# Patient Record
Sex: MALE | Race: WHITE | Employment: PART TIME | URBAN - METROPOLITAN AREA
[De-identification: names, ages, dates, MRNs, and addresses within clinical notes are randomized per-mention and may not be internally consistent; named-entity substitution may affect disease eponyms.]

---

## 2018-01-10 ENCOUNTER — OFFICE VISIT (OUTPATIENT)
Dept: FAMILY MEDICINE CLINIC | Age: 28
End: 2018-01-10

## 2018-01-10 DIAGNOSIS — Z11.3 SCREENING FOR STD (SEXUALLY TRANSMITTED DISEASE): ICD-10-CM

## 2018-01-10 DIAGNOSIS — Z13.31 SCREENING FOR DEPRESSION: ICD-10-CM

## 2018-01-10 DIAGNOSIS — Z83.42 FAMILY HISTORY OF HIGH CHOLESTEROL: ICD-10-CM

## 2018-01-10 DIAGNOSIS — Z00.00 ROUTINE PHYSICAL EXAMINATION: Primary | ICD-10-CM

## 2018-01-10 DIAGNOSIS — Z11.4 SCREENING FOR HIV (HUMAN IMMUNODEFICIENCY VIRUS): ICD-10-CM

## 2018-01-10 DIAGNOSIS — Z83.42 FAMILY HISTORY OF HYPERCHOLESTEROLEMIA: ICD-10-CM

## 2018-01-10 RX ORDER — CALCIUM CARBONATE 200(500)MG
1 TABLET,CHEWABLE ORAL AS NEEDED
COMMUNITY
Start: 2018-01-10

## 2018-01-10 NOTE — PATIENT INSTRUCTIONS

## 2018-01-10 NOTE — PROGRESS NOTES
HISTORY OF PRESENT ILLNESS  Joseph Lucas is a 24 y.o. male. HPI Comments: New patient presents today to establish care and for routine physical exam with labs, requesting HIV testing. Only complaint today is cough, onset 1 month ago, intermittent, has improved since onset, sputum production occasional clear and thin, with rhinorrhea occasional scant epistaxis. He has been taking dayquil with relief. Denies fever, chills, body aches, sore throat, sinus pressure. Past Medical History:  No date: Acid reflux  No date: Anxiety  No date: Asthma  2016: Dehydration      Comment: requiring hospitalization  No date: History of attention deficit hyperactivity dis*      Comment: childhood- treated with medication  No date: IBS (irritable bowel syndrome)      Comment: Constipation  sophmore year HS: MRSA nasal colonization      Comment: requiring surgical removal       Current Outpatient Prescriptions:     calcium carbonate (TUMS) 200 mg calcium (500 mg) chew, Take 1 Tab by mouth as needed    lansoprazole (PREVACID) 15 mg capsule, Take  by mouth Daily (before breakfast) as needed    Review of patient's family history indicates:    Breast Cancer                  Mother                    High Cholesterol               Mother                    Other                          Mother                      Comment: IBS, stomach ulcers     Social History:  Alcohol use: Yes                Comment: rarely    Drug use: Yes                Special: Marijuana    Sexual activity: Yes               Partners with: Male         Physical   The history is provided by the patient. This is a new problem. Pertinent negatives include no chest pain, no abdominal pain, no headaches and no shortness of breath. Cough   The history is provided by the patient. This is a new problem. The current episode started more than 1 week ago. The problem occurs every several days. The problem has been gradually improving.  Pertinent negatives include no chest pain, no abdominal pain, no headaches and no shortness of breath. The symptoms are aggravated by coughing. Treatments tried: dayquil. The treatment provided mild relief. Review of Systems   Constitutional: Negative. Negative for chills and fever. HENT: Negative. Negative for ear pain, sinus pain and sore throat. Eyes: Negative. Respiratory: Positive for cough and sputum production. Negative for shortness of breath and wheezing. Cardiovascular: Negative. Negative for chest pain and palpitations. Gastrointestinal: Negative. Negative for abdominal pain. Genitourinary: Negative. Musculoskeletal: Negative. Skin: Negative. Negative for itching and rash. Neurological: Negative. Negative for headaches. Endo/Heme/Allergies: Negative. Psychiatric/Behavioral: The patient is nervous/anxious. Physical Exam   Constitutional: He is oriented to person, place, and time. No distress. HENT:   Right Ear: Tympanic membrane and external ear normal.   Left Ear: Tympanic membrane and external ear normal.   Nose: Nose normal. No mucosal edema or rhinorrhea. No epistaxis. Mouth/Throat: Uvula is midline and oropharynx is clear and moist.   Eyes: Conjunctivae are normal. Pupils are equal, round, and reactive to light. Cardiovascular: Normal rate, regular rhythm, normal heart sounds and intact distal pulses. Pulmonary/Chest: Effort normal and breath sounds normal. No respiratory distress. He has no wheezes. Lymphadenopathy:     He has no cervical adenopathy. Neurological: He is alert and oriented to person, place, and time. Skin: Skin is warm and dry. Psychiatric: He has a normal mood and affect. ASSESSMENT and PLAN    ICD-10-CM ICD-9-CM    1. Routine physical examination Z00.00 V70.0 CBC W/O DIFF      METABOLIC PANEL, COMPREHENSIVE      LIPID PANEL   2.  Family history of hypercholesterolemia Z83.42 V18.19 LIPID PANEL   3. Screening for HIV (human immunodeficiency virus) Z11.4 V73.89 HIV 1/2 AG/AB, 4TH GENERATION,W RFLX CONFIRM     Reviewed plan with patient including diagnoses, treatment and follow up. Provided AVS with education on above diagnoses. No further questions/concerns at this time. Pt to follow up as scheduled or sooner if symptoms worsen/fail to improve.

## 2018-01-10 NOTE — MR AVS SNAPSHOT
Visit Information Date & Time Provider Department Dept. Phone Encounter #  
 1/10/2018  2:48 PM Valentín Fournier  [facility] [phone] [id number] Allergies as of 1/10/2018  Review Complete On: 11/2/2013 By: Jhon Byers Not on File Current Immunizations  Never Reviewed No immunizations on file. Not reviewed this visit You Were Diagnosed With   
  
 Codes Comments Routine physical examination    -  Primary ICD-10-CM: Z00.00 ICD-9-CM: V70.0 Family history of hypercholesterolemia     ICD-10-CM: Z83.42 
ICD-9-CM: V18.19 Vitals Smoking Status Current Every Day Smoker Your Updated Medication List  
  
   
This list is accurate as of: 1/10/18  3:02 PM.  Always use your most recent med list.  
  
  
  
  
 polyethylene glycol 17 gram/dose powder Commonly known as:  Tomas Paradise Take 17 g by mouth daily. 1 tablespoon with 8 oz of water daily PREVACID 15 mg capsule Generic drug:  lansoprazole Take  by mouth Daily (before breakfast). To-Do List   
 01/10/2018 Lab:  CBC W/O DIFF   
  
 01/10/2018 Lab:  LIPID PANEL   
  
 01/10/2018 Lab:  METABOLIC PANEL, COMPREHENSIVE Patient Instructions Well Visit, Ages 25 to 48: Care Instructions Your Care Instructions Physical exams can help you stay healthy. Your doctor has checked your overall health and may have suggested ways to take good care of yourself. He or she also may have recommended tests. At home, you can help prevent illness with healthy eating, regular exercise, and other steps. Follow-up care is a key part of your treatment and safety. Be sure to make and go to all appointments, and call your doctor if you are having problems. It's also a good idea to know your test results and keep a list of the medicines you take. How can you care for yourself at home? · Reach and stay at a healthy weight. This will lower your risk for many problems, such as obesity, diabetes, heart disease, and high blood pressure. · Get at least 30 minutes of physical activity on most days of the week. Walking is a good choice. You also may want to do other activities, such as running, swimming, cycling, or playing tennis or team sports. Discuss any changes in your exercise program with your doctor. · Do not smoke or allow others to smoke around you. If you need help quitting, talk to your doctor about stop-smoking programs and medicines. These can increase your chances of quitting for good. · Talk to your doctor about whether you have any risk factors for sexually transmitted infections (STIs). Having one sex partner (who does not have STIs and does not have sex with anyone else) is a good way to avoid these infections. · Use birth control if you do not want to have children at this time. Talk with your doctor about the choices available and what might be best for you. · Protect your skin from too much sun. When you're outdoors from 10 a.m. to 4 p.m., stay in the shade or cover up with clothing and a hat with a wide brim. Wear sunglasses that block UV rays. Even when it's cloudy, put broad-spectrum sunscreen (SPF 30 or higher) on any exposed skin. · See a dentist one or two times a year for checkups and to have your teeth cleaned. · Wear a seat belt in the car. · Drink alcohol in moderation, if at all. That means no more than 2 drinks a day for men and 1 drink a day for women. Follow your doctor's advice about when to have certain tests. These tests can spot problems early. For everyone · Cholesterol. Have the fat (cholesterol) in your blood tested after age 21. Your doctor will tell you how often to have this done based on your age, family history, or other things that can increase your risk for heart disease. · Blood pressure. Have your blood pressure checked during a routine doctor visit. Your doctor will tell you how often to check your blood pressure based on your age, your blood pressure results, and other factors. · Vision. Talk with your doctor about how often to have a glaucoma test. 
· Diabetes. Ask your doctor whether you should have tests for diabetes. · Colon cancer. Have a test for colon cancer at age 48. You may have one of several tests. If you are younger than 48, you may need a test earlier if you have any risk factors. Risk factors include whether you already had a precancerous polyp removed from your colon or whether your parent, brother, sister, or child has had colon cancer. For women · Breast exam and mammogram. Talk to your doctor about when you should have a clinical breast exam and a mammogram. Medical experts differ on whether and how often women under 50 should have these tests. Your doctor can help you decide what is right for you. · Pap test and pelvic exam. Begin Pap tests at age 24. A Pap test is the best way to find cervical cancer. The test often is part of a pelvic exam. Ask how often to have this test. 
· Tests for sexually transmitted infections (STIs). Ask whether you should have tests for STIs. You may be at risk if you have sex with more than one person, especially if your partners do not wear condoms. For men · Tests for sexually transmitted infections (STIs). Ask whether you should have tests for STIs. You may be at risk if you have sex with more than one person, especially if you do not wear a condom. · Testicular cancer exam. Ask your doctor whether you should check your testicles regularly. · Prostate exam. Talk to your doctor about whether you should have a blood test (called a PSA test) for prostate cancer.  Experts differ on whether and when men should have this test. Some experts suggest it if you are older than 39 and are -American or have a father or brother who got prostate cancer when he was younger than 72. When should you call for help? Watch closely for changes in your health, and be sure to contact your doctor if you have any problems or symptoms that concern you. Where can you learn more? Go to http://eugenie-belem.info/. Enter P072 in the search box to learn more about \"Well Visit, Ages 25 to 48: Care Instructions. \" Current as of: May 12, 2017 Content Version: 11.4 © 5788-8369 [employer]. Care instructions adapted under license by [employer] (which disclaims liability or warranty for this information). If you have questions about a medical condition or this instruction, always ask your healthcare professional. [address] any warranty or liability for your use of this information. Introducing [address]! Pat Brown introduces [employer] patient portal. Now you can access parts of your medical record, email your doctor's office, and request medication refills online. 1. In your internet browser, go to https://"[employer]". "[employer]/"[employer]" 2. Click on the First Time User? Click Here link in the Sign In box. You will see the New Member Sign Up page. 3. Enter your [employer] Access Code exactly as it appears below. You will not need to use this code after youve completed the sign-up process. If you do not sign up before the expiration date, you must request a new code. · [employer] Access Code: [email or web address] Expires: 4/10/2018  3:02 PM 
 
4. Enter the last four digits of your Social Security Number (xxxx) and Date of Birth (mm/dd/yyyy) as indicated and click Submit. You will be taken to the next sign-up page. 5. Create a [employer] ID. This will be your [employer] login ID and cannot be changed, so think of one that is secure and easy to remember. 6. Create a [employer] password. You can change your password at any time. 7. Enter your Password Reset Question and Answer. This can be used at a later time if you forget your password. 8. Enter your e-mail address. You will receive e-mail notification when new information is available in [address] 9. Click Sign Up. You can now view and download portions of your medical record. 10. Click the Download Summary menu link to download a portable copy of your medical information. If you have questions, please visit the Frequently Asked Questions section of the [employer] website. Remember, [employer] is NOT to be used for urgent needs. For medical emergencies, dial 911. Now available from your iPhone and Android! Please provide this summary of care documentation to your next provider. Your primary care clinician is listed as NONE. If you have any questions after today's visit, please call [phone].

## 2018-01-11 ENCOUNTER — LAB ONLY (OUTPATIENT)
Dept: FAMILY MEDICINE CLINIC | Age: 28
End: 2018-01-11

## 2018-01-11 ENCOUNTER — HOSPITAL ENCOUNTER (OUTPATIENT)
Dept: LAB | Age: 28
Discharge: HOME | End: 2018-01-11
Payer: SELF-PAY

## 2018-01-11 VITALS
RESPIRATION RATE: 18 BRPM | HEART RATE: 80 BPM | DIASTOLIC BLOOD PRESSURE: 75 MMHG | WEIGHT: 149 LBS | TEMPERATURE: 98.5 F | BODY MASS INDEX: 18.15 KG/M2 | SYSTOLIC BLOOD PRESSURE: 121 MMHG | HEIGHT: 76 IN | OXYGEN SATURATION: 98 %

## 2018-01-11 DIAGNOSIS — Z83.42 FAMILY HISTORY OF HYPERCHOLESTEROLEMIA: ICD-10-CM

## 2018-01-11 DIAGNOSIS — Z00.00 ROUTINE PHYSICAL EXAMINATION: ICD-10-CM

## 2018-01-11 DIAGNOSIS — Z11.4 SCREENING FOR HIV (HUMAN IMMUNODEFICIENCY VIRUS): ICD-10-CM

## 2018-01-11 DIAGNOSIS — Z83.42 FAMILY HISTORY OF HIGH CHOLESTEROL: ICD-10-CM

## 2018-01-11 DIAGNOSIS — Z11.3 SCREENING FOR STD (SEXUALLY TRANSMITTED DISEASE): ICD-10-CM

## 2018-01-11 PROBLEM — Z13.31 SCREENING FOR DEPRESSION: Status: ACTIVE | Noted: 2018-01-11

## 2018-01-11 LAB
ALBUMIN SERPL-MCNC: 4.2 G/DL (ref 3.4–5)
ALBUMIN/GLOB SERPL: 1.2 {RATIO} (ref 0.8–1.7)
ALP SERPL-CCNC: 74 U/L (ref 45–117)
ALT SERPL-CCNC: 21 U/L (ref 16–61)
ANION GAP SERPL CALC-SCNC: 8 MMOL/L (ref 3–18)
AST SERPL-CCNC: 14 U/L (ref 15–37)
BILIRUB SERPL-MCNC: 0.3 MG/DL (ref 0.2–1)
BUN SERPL-MCNC: 10 MG/DL (ref 7–18)
BUN/CREAT SERPL: 12 (ref 12–20)
CALCIUM SERPL-MCNC: 9.4 MG/DL (ref 8.5–10.1)
CHLORIDE SERPL-SCNC: 104 MMOL/L (ref 100–108)
CHOLEST SERPL-MCNC: 154 MG/DL
CO2 SERPL-SCNC: 30 MMOL/L (ref 21–32)
CREAT SERPL-MCNC: 0.83 MG/DL (ref 0.6–1.3)
ERYTHROCYTE [DISTWIDTH] IN BLOOD BY AUTOMATED COUNT: 13.1 % (ref 11.6–14.5)
GLOBULIN SER CALC-MCNC: 3.5 G/DL (ref 2–4)
GLUCOSE SERPL-MCNC: 114 MG/DL (ref 74–99)
HCT VFR BLD AUTO: 46.7 % (ref 36–48)
HDLC SERPL-MCNC: 47 MG/DL (ref 40–60)
HDLC SERPL: 3.3 {RATIO} (ref 0–5)
HGB BLD-MCNC: 15.1 G/DL (ref 13–16)
LDLC SERPL CALC-MCNC: 88.8 MG/DL (ref 0–100)
LIPID PROFILE,FLP: NORMAL
MCH RBC QN AUTO: 29.4 PG (ref 24–34)
MCHC RBC AUTO-ENTMCNC: 32.3 G/DL (ref 31–37)
MCV RBC AUTO: 90.9 FL (ref 74–97)
PLATELET # BLD AUTO: 153 K/UL (ref 135–420)
PMV BLD AUTO: 11 FL (ref 9.2–11.8)
POTASSIUM SERPL-SCNC: 4.4 MMOL/L (ref 3.5–5.5)
PROT SERPL-MCNC: 7.7 G/DL (ref 6.4–8.2)
RBC # BLD AUTO: 5.14 M/UL (ref 4.7–5.5)
SODIUM SERPL-SCNC: 142 MMOL/L (ref 136–145)
TRIGL SERPL-MCNC: 91 MG/DL (ref ?–150)
VLDLC SERPL CALC-MCNC: 18.2 MG/DL
WBC # BLD AUTO: 5.7 K/UL (ref 4.6–13.2)

## 2018-01-11 PROCEDURE — 87389 HIV-1 AG W/HIV-1&-2 AB AG IA: CPT | Performed by: NURSE PRACTITIONER

## 2018-01-11 PROCEDURE — 80053 COMPREHEN METABOLIC PANEL: CPT | Performed by: NURSE PRACTITIONER

## 2018-01-11 PROCEDURE — 85027 COMPLETE CBC AUTOMATED: CPT | Performed by: NURSE PRACTITIONER

## 2018-01-11 PROCEDURE — 80061 LIPID PANEL: CPT | Performed by: NURSE PRACTITIONER

## 2018-01-11 RX ORDER — CALCIUM CARBONATE 200(500)MG
1 TABLET,CHEWABLE ORAL AS NEEDED
COMMUNITY
Start: 2018-01-11

## 2018-01-11 NOTE — PROGRESS NOTES
Pt presented to the clinic yesterday (01/10/2018) to establish care and have a CPE. Flu Shot Requested: no    Depression Screening:  PHQ over the last two weeks 1/11/2018   Little interest or pleasure in doing things Not at all   Feeling down, depressed or hopeless Not at all   Total Score PHQ 2 0       Learning Assessment:  Learning Assessment 1/10/2018   PRIMARY LEARNER Patient   HIGHEST LEVEL OF EDUCATION - PRIMARY LEARNER  SOME COLLEGE   BARRIERS PRIMARY LEARNER NONE   CO-LEARNER CAREGIVER No   PRIMARY LANGUAGE ENGLISH   LEARNER PREFERENCE PRIMARY DEMONSTRATION   ANSWERED BY Patient   RELATIONSHIP SELF       Abuse Screening:  No flowsheet data found. Health Maintenance reviewed and discussed per provider: yes     Coordination of Care:    1. Have you been to the ER, urgent care clinic since your last visit? Hospitalized since your last visit? no    2. Have you seen or consulted any other health care providers outside of the [address] [place] [address] since your last visit? Include any pap smears or colon screening.  no

## 2018-01-11 NOTE — PROGRESS NOTES
Previous documentation in orders only encounter    HISTORY OF PRESENT ILLNESS  Gisele Monteiro is a 24 y.o. male. HPI Comments: New patient presents 1/10/18 to establish care and requesting CPE with labs, HIV test add on. Also reports cough, onset December, with intermittent clear sputum production, has been taking dayquil prn with relief. Past Medical History:  No date: Acid reflux  No date: Anxiety  No date: Attention deficit hyperactivity disorder (ADHD)      Comment: throughout childhood, treated with meds  2017: Dehydration      Comment: requiring hospitalization, IV nutrition  No date: IBS (irritable bowel syndrome)      Comment: IBS-C  2008: MRSA nasal colonization      Comment: requiring surgical removal     Current Outpatient Prescriptions:     calcium carbonate (TUMS) 200 mg calcium (500 mg) chew, Take 1 Tab by mouth as needed    Review of patient's family history indicates:    Breast Cancer                  Mother                    High Cholesterol               Mother                    Other                          Mother                      Comment: \"low BP\", tobacco abuse    No Known Problems              Father                     Social History  Smokeless status: Never Used                        Alcohol use: Yes                Comment: rare    Drug use: Yes                Special: Marijuana       Comment: for anxiety    Sexual activity: Yes               Partners with: Male         Complete Physical   The history is provided by the patient. This is a new problem. Pertinent negatives include no chest pain, no abdominal pain, no headaches and no shortness of breath. Cough   The history is provided by the patient. This is a new problem. The current episode started more than 1 week ago (x 1 month). The problem occurs every several days. The problem has been gradually improving. Pertinent negatives include no chest pain, no abdominal pain, no headaches and no shortness of breath.  The symptoms are aggravated by coughing. Relieved by: dayquil. Treatments tried: dayquil. The treatment provided moderate relief. Review of Systems   Respiratory: Positive for cough. Negative for shortness of breath. Cardiovascular: Negative for chest pain. Gastrointestinal: Negative for abdominal pain. Neurological: Negative for headaches. Physical Exam   Constitutional: He is oriented to person, place, and time. No distress. Thin body frame   HENT:   Right Ear: External ear normal.   Left Ear: External ear normal.   Nose: Nose normal.   Mouth/Throat: Oropharynx is clear and moist.   Cardiovascular: Normal rate, regular rhythm and normal heart sounds. Pulmonary/Chest: Effort normal and breath sounds normal. No respiratory distress. He has no wheezes. Lymphadenopathy:     He has no cervical adenopathy. Neurological: He is alert and oriented to person, place, and time. Skin: Skin is warm and dry. There is pallor (natural). Psychiatric: He has a normal mood and affect. Visit Vitals    /75    Pulse 80    Temp 98.5 °F (36.9 °C) (Oral)    Resp 18    Ht 6' 4\" (1.93 m)    Wt 149 lb (67.6 kg)    SpO2 98%    BMI 18.14 kg/m2        ASSESSMENT and PLAN  Diagnoses and all orders for this visit:    1. Routine physical examination  -     CBC W/O DIFF; Future  -     METABOLIC PANEL, COMPREHENSIVE; Future    2. Screening for STD (sexually transmitted disease)  -     HIV 1/2 AG/AB, 4TH GENERATION,W RFLX CONFIRM; Future    3. Family history of high cholesterol  -     LIPID PANEL; Future    4. Screening for depression  -     [place] BEHAV ASSMT W/SCORE & DOCD/STAND INSTRUMENT     Reviewed plan with patient including diagnoses, treatment and follow up. Provided AVS with education on above diagnoses. No further questions/concerns at this time. Pt to follow up as scheduled or sooner if symptoms worsen/fail to improve.

## 2018-01-16 ENCOUNTER — TELEPHONE (OUTPATIENT)
Dept: INTERNAL MEDICINE CLINIC | Age: 28
End: 2018-01-16

## 2018-01-16 LAB
HIV 1+2 AB+HIV1 P24 AG SERPL QL IA: NONREACTIVE
HIV12 RESULT COMMENT, HHIVC: NORMAL